# Patient Record
Sex: FEMALE | Race: WHITE
[De-identification: names, ages, dates, MRNs, and addresses within clinical notes are randomized per-mention and may not be internally consistent; named-entity substitution may affect disease eponyms.]

---

## 2017-08-06 ENCOUNTER — HOSPITAL ENCOUNTER (EMERGENCY)
Dept: HOSPITAL 8 - ED | Age: 52
Discharge: HOME | End: 2017-08-06
Payer: MEDICARE

## 2017-08-06 VITALS — DIASTOLIC BLOOD PRESSURE: 69 MMHG | SYSTOLIC BLOOD PRESSURE: 104 MMHG

## 2017-08-06 VITALS — HEIGHT: 62 IN | WEIGHT: 134.26 LBS | BODY MASS INDEX: 24.71 KG/M2
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
## 2017-08-06 DIAGNOSIS — S61.012A: Primary | ICD-10-CM
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
## 2017-08-06 DIAGNOSIS — Z88.5: ICD-10-CM

## 2017-08-06 PROCEDURE — 90471 IMMUNIZATION ADMIN: CPT

## 2017-08-06 PROCEDURE — 90715 TDAP VACCINE 7 YRS/> IM: CPT

## 2017-08-06 PROCEDURE — 99283 EMERGENCY DEPT VISIT LOW MDM: CPT

## 2017-08-06 PROCEDURE — 12001 RPR S/N/AX/GEN/TRNK 2.5CM/<: CPT

## 2017-12-31 ENCOUNTER — HOSPITAL ENCOUNTER (EMERGENCY)
Dept: HOSPITAL 8 - ED | Age: 52
Discharge: HOME | End: 2017-12-31
Payer: MEDICARE

## 2017-12-31 VITALS — DIASTOLIC BLOOD PRESSURE: 70 MMHG | SYSTOLIC BLOOD PRESSURE: 104 MMHG

## 2017-12-31 VITALS — HEIGHT: 62 IN | WEIGHT: 130.29 LBS | BODY MASS INDEX: 23.98 KG/M2

## 2017-12-31 DIAGNOSIS — Z87.891: ICD-10-CM

## 2017-12-31 DIAGNOSIS — B02.9: Primary | ICD-10-CM

## 2017-12-31 PROCEDURE — 99283 EMERGENCY DEPT VISIT LOW MDM: CPT

## 2019-02-03 ENCOUNTER — HOSPITAL ENCOUNTER (EMERGENCY)
Dept: HOSPITAL 8 - ED | Age: 54
Discharge: HOME | End: 2019-02-03
Payer: MEDICARE

## 2019-02-03 VITALS — DIASTOLIC BLOOD PRESSURE: 70 MMHG | SYSTOLIC BLOOD PRESSURE: 103 MMHG

## 2019-02-03 VITALS — WEIGHT: 131.62 LBS | HEIGHT: 62 IN | BODY MASS INDEX: 24.22 KG/M2

## 2019-02-03 DIAGNOSIS — F43.10: ICD-10-CM

## 2019-02-03 DIAGNOSIS — R07.89: Primary | ICD-10-CM

## 2019-02-03 DIAGNOSIS — Z87.891: ICD-10-CM

## 2019-02-03 DIAGNOSIS — F32.9: ICD-10-CM

## 2019-02-03 PROCEDURE — 96372 THER/PROPH/DIAG INJ SC/IM: CPT

## 2019-02-03 PROCEDURE — 99283 EMERGENCY DEPT VISIT LOW MDM: CPT

## 2019-02-03 PROCEDURE — 71111 X-RAY EXAM RIBS/CHEST4/> VWS: CPT

## 2019-02-03 NOTE — NUR
PT STATES PAIN IS IMPROVING POST MEDS. NOW ABLE TO TAKE DEEP BREATH. CLEARED 
FOR D/C. PT AMBULATING WELL UPON DEPARTURE.